# Patient Record
Sex: MALE | Race: WHITE | NOT HISPANIC OR LATINO | URBAN - METROPOLITAN AREA
[De-identification: names, ages, dates, MRNs, and addresses within clinical notes are randomized per-mention and may not be internally consistent; named-entity substitution may affect disease eponyms.]

---

## 2020-02-27 PROBLEM — 266464001 HEMORRHAGE OF RECTUM AND ANUS: Status: ACTIVE | Noted: 2020-02-27

## 2020-02-27 PROBLEM — 271737000 ANEMIA: Status: ACTIVE | Noted: 2020-02-27

## 2020-02-27 PROBLEM — 161464003 HISTORY OF PSYCHIATRIC DISORDER: Status: ACTIVE | Noted: 2020-02-27

## 2020-02-27 PROBLEM — 62315008 DIARRHEA: Status: INACTIVE | Noted: 2020-02-27

## 2020-02-27 PROBLEM — 420054005 ALCOHOLIC CIRRHOSIS: Status: ACTIVE | Noted: 2020-02-27

## 2020-05-13 PROBLEM — 42345000 POLYNEUROPATHY: Status: ACTIVE | Noted: 2020-05-13

## 2020-05-13 PROBLEM — 110483000 TOBACCO USE: Status: ACTIVE | Noted: 2020-05-13

## 2020-05-13 PROBLEM — 275978004 SCREENING FOR MALIGNANT NEOPLASM OF COLON: Status: ACTIVE | Noted: 2020-05-13

## 2020-08-11 ENCOUNTER — OFFICE VISIT (OUTPATIENT)
Dept: URBAN - METROPOLITAN AREA CLINIC 27 | Facility: CLINIC | Age: 55
End: 2020-08-11

## 2020-08-11 PROBLEM — 14760008 CONSTIPATION: Status: ACTIVE | Noted: 2020-08-11

## 2020-08-28 ENCOUNTER — OFFICE VISIT (OUTPATIENT)
Dept: URBAN - METROPOLITAN AREA SURGERY CENTER 7 | Facility: SURGERY CENTER | Age: 55
End: 2020-08-28

## 2022-04-30 ENCOUNTER — TELEPHONE ENCOUNTER (OUTPATIENT)
Dept: URBAN - METROPOLITAN AREA CLINIC 121 | Facility: CLINIC | Age: 57
End: 2022-04-30

## 2022-04-30 RX ORDER — PROMETHAZINE HYDROCHLORIDE 25 MG/1
1 PR Q HS SUPPOSITORY RECTAL
OUTPATIENT
Start: 2009-01-07

## 2022-04-30 RX ORDER — DIPHENOXYLATE HYDROCHLORIDE AND ATROPINE SULFATE 2.5; .025 MG/1; MG/1
TABLET ORAL
OUTPATIENT
Start: 2009-11-18

## 2022-04-30 RX ORDER — ESOMEPRAZOLE MAGNESIUM 40 MG
TAKE 1 TAB PO QD CAPSULE,DELAYED RELEASE (ENTERIC COATED) ORAL
OUTPATIENT
Start: 2014-02-21 | End: 2020-02-26

## 2022-04-30 RX ORDER — PROMETHAZINE HYDROCHLORIDE 25 MG/1
1 PO Q6H PRN TABLET ORAL
OUTPATIENT
Start: 2006-10-02

## 2022-04-30 RX ORDER — ESOMEPRAZOLE MAGNESIUM 40 MG
TAKE 1 CAPSULE BY MOUTH 30 MINUTES BEFORE BREAKFAST CAPSULE,DELAYED RELEASE (ENTERIC COATED) ORAL
OUTPATIENT
Start: 2013-06-17 | End: 2020-02-26

## 2022-04-30 RX ORDER — SPIRONOLACTONE 25 MG/1
QD TABLET, FILM COATED ORAL
OUTPATIENT
Start: 2009-11-18 | End: 2020-02-26

## 2022-04-30 RX ORDER — FENTANYL CITRATE 1200 UG/1
LOZENGE ORAL; TRANSMUCOSAL
OUTPATIENT
Start: 2006-01-27

## 2022-04-30 RX ORDER — SPIRONOLACTONE 25 MG/1
QD TABLET, FILM COATED ORAL
OUTPATIENT
Start: 2009-11-18

## 2022-04-30 RX ORDER — DEXLANSOPRAZOLE 60 MG/1
TAKE 1 CAP PO QD CAPSULE, DELAYED RELEASE ORAL
OUTPATIENT
Start: 2012-06-15 | End: 2020-02-26

## 2022-04-30 RX ORDER — METOCLOPRAMIDE HYDROCHLORIDE 10 MG/1
TAKE 1 TAB PO TID TABLET ORAL
OUTPATIENT
Start: 2012-06-15 | End: 2020-02-26

## 2022-04-30 RX ORDER — DIPHENOXYLATE HCL/ATROPINE 2.5-.025MG
1-2 PO Q 6H  PRN TABLET ORAL
OUTPATIENT
Start: 2011-03-01

## 2022-04-30 RX ORDER — BACLOFEN 10 MG/1
1/2 TABLET PO TID TABLET ORAL
OUTPATIENT
Start: 2020-03-26 | End: 2020-05-13

## 2022-04-30 RX ORDER — PROMETHAZINE HYDROCHLORIDE 25 MG/1
1 PO Q6H PRN TABLET ORAL
OUTPATIENT
Start: 2007-07-12 | End: 2007-07-12

## 2022-04-30 RX ORDER — ESOMEPRAZOLE MAGNESIUM 40 MG
1 CAPSULE PO QAM CAPSULE,DELAYED RELEASE (ENTERIC COATED) ORAL
OUTPATIENT
Start: 2013-10-25 | End: 2020-02-26

## 2022-04-30 RX ORDER — DEXLANSOPRAZOLE 60 MG/1
1 PO QD CAPSULE, DELAYED RELEASE ORAL
OUTPATIENT
Start: 2011-09-21

## 2022-04-30 RX ORDER — ESOMEPRAZOLE MAGNESIUM 40 MG
TAKE 1 TAB PO QD CAPSULE,DELAYED RELEASE (ENTERIC COATED) ORAL
OUTPATIENT
Start: 2014-02-21

## 2022-04-30 RX ORDER — DEXLANSOPRAZOLE 60 MG/1
TAKE 1 CAP PO QD CAPSULE, DELAYED RELEASE ORAL
OUTPATIENT
Start: 2012-06-15

## 2022-04-30 RX ORDER — METOCLOPRAMIDE HYDROCHLORIDE 10 MG/1
1 AC, PRN TABLET ORAL
OUTPATIENT
Start: 2011-08-05

## 2022-04-30 RX ORDER — PROMETHAZINE HYDROCHLORIDE 25 MG/1
1 PR Q 6HRS PRN SUPPOSITORY RECTAL
OUTPATIENT
Start: 2009-05-26

## 2022-04-30 RX ORDER — PROMETHAZINE HYDROCHLORIDE 25 MG/1
1 PR Q 6HRS PRN SUPPOSITORY RECTAL
OUTPATIENT
Start: 2009-05-26 | End: 2020-02-26

## 2022-04-30 RX ORDER — DIPHENOXYLATE HCL/ATROPINE 2.5-.025MG
1-2 PO Q 6H  PRN TABLET ORAL
OUTPATIENT
Start: 2011-03-01 | End: 2020-02-26

## 2022-04-30 RX ORDER — FENTANYL CITRATE 1200 UG/1
LOZENGE ORAL; TRANSMUCOSAL
OUTPATIENT
Start: 2006-01-27 | End: 2020-02-26

## 2022-04-30 RX ORDER — PROMETHAZINE HYDROCHLORIDE 25 MG/1
1 PR Q6H PRN SUPPOSITORY RECTAL
OUTPATIENT
Start: 2006-10-02 | End: 2020-02-26

## 2022-04-30 RX ORDER — PANCRELIPASE 24000; 76000; 120000 [USP'U]/1; [USP'U]/1; [USP'U]/1
TAKE 1 TAB PO BID CAPSULE, DELAYED RELEASE PELLETS ORAL
OUTPATIENT
Start: 2012-06-15

## 2022-04-30 RX ORDER — ESOMEPRAZOLE MAGNESIUM 40 MG
1 CAPSULE PO QAM CAPSULE,DELAYED RELEASE (ENTERIC COATED) ORAL
OUTPATIENT
Start: 2013-10-25

## 2022-04-30 RX ORDER — PAROXETINE HYDROCHLORIDE 20 MG/1
TAKE 1/2 QD TABLET, FILM COATED ORAL
OUTPATIENT
Start: 2011-01-24

## 2022-04-30 RX ORDER — BACLOFEN 10 MG/1
1/2 TABLET PO TID TABLET ORAL
OUTPATIENT
Start: 2020-03-26

## 2022-04-30 RX ORDER — PAROXETINE HYDROCHLORIDE 20 MG/1
TAKE 1/2 QD TABLET, FILM COATED ORAL
OUTPATIENT
Start: 2011-01-24 | End: 2020-02-26

## 2022-04-30 RX ORDER — METOCLOPRAMIDE HYDROCHLORIDE 10 MG/1
1 AC, PRN TABLET ORAL
OUTPATIENT
Start: 2011-08-05 | End: 2020-02-26

## 2022-04-30 RX ORDER — METOCLOPRAMIDE HYDROCHLORIDE 10 MG/1
TAKE 1 TAB PO TID TABLET ORAL
OUTPATIENT
Start: 2012-06-15

## 2022-04-30 RX ORDER — DEXLANSOPRAZOLE 60 MG/1
1 PO QD CAPSULE, DELAYED RELEASE ORAL
OUTPATIENT
Start: 2011-09-21 | End: 2020-02-26

## 2022-04-30 RX ORDER — ESOMEPRAZOLE MAGNESIUM 40 MG
TAKE 1 CAPSULE BY MOUTH 30 MINUTES BEFORE BREAKFAST CAPSULE,DELAYED RELEASE (ENTERIC COATED) ORAL
OUTPATIENT
Start: 2013-06-17

## 2022-04-30 RX ORDER — PANCRELIPASE 24000; 76000; 120000 [USP'U]/1; [USP'U]/1; [USP'U]/1
TAKE 1 TAB PO BID CAPSULE, DELAYED RELEASE PELLETS ORAL
OUTPATIENT
Start: 2012-06-15 | End: 2020-02-26

## 2022-04-30 RX ORDER — DIPHENOXYLATE HYDROCHLORIDE AND ATROPINE SULFATE 2.5; .025 MG/1; MG/1
TABLET ORAL
OUTPATIENT
Start: 2009-11-18 | End: 2020-02-26

## 2022-05-01 ENCOUNTER — TELEPHONE ENCOUNTER (OUTPATIENT)
Dept: URBAN - METROPOLITAN AREA CLINIC 121 | Facility: CLINIC | Age: 57
End: 2022-05-01

## 2022-05-01 RX ORDER — ESOMEPRAZOLE MAGNESIUM 40 MG
1 PO QD CAPSULE,DELAYED RELEASE (ENTERIC COATED) ORAL
Status: ACTIVE | COMMUNITY
Start: 2005-10-03

## 2022-05-01 RX ORDER — HEPATITIS A AND HEPATITIS B (RECOMBINANT) VACCINE 720; 20 [IU]/ML; UG/ML
INJECT 1 VIAL  IM AS DIRECTED...3 SERIES SHOT...0, 1, 6 MONTHS INJECTION, SUSPENSION INTRAMUSCULAR
Status: ACTIVE | COMMUNITY
Start: 2020-08-28

## 2022-05-01 RX ORDER — SPIRONOLACTONE 50 MG/1
TAKE ONE TABLET BY MOUTH ONCE DAILY TABLET, FILM COATED ORAL
Status: ACTIVE | COMMUNITY
Start: 2020-02-26

## 2022-05-01 RX ORDER — FUROSEMIDE 40 MG/1
TAKE ONE TABLET BY MOUTH ONCE DAILY TABLET ORAL
Status: ACTIVE | COMMUNITY
Start: 2020-02-26

## 2022-12-28 ENCOUNTER — OFFICE VISIT (OUTPATIENT)
Dept: URBAN - METROPOLITAN AREA CLINIC 27 | Facility: CLINIC | Age: 57
End: 2022-12-28

## 2022-12-28 RX ORDER — SPIRONOLACTONE 50 MG/1
TAKE ONE TABLET BY MOUTH ONCE DAILY TABLET, FILM COATED ORAL
Status: ACTIVE | COMMUNITY
Start: 2020-02-26

## 2022-12-28 RX ORDER — FUROSEMIDE 40 MG/1
TAKE ONE TABLET BY MOUTH ONCE DAILY TABLET ORAL
Status: ACTIVE | COMMUNITY
Start: 2020-02-26

## 2022-12-28 RX ORDER — ESOMEPRAZOLE MAGNESIUM 40 MG
1 PO QD CAPSULE,DELAYED RELEASE (ENTERIC COATED) ORAL
Status: ACTIVE | COMMUNITY
Start: 2005-10-03

## 2022-12-28 RX ORDER — HEPATITIS A AND HEPATITIS B (RECOMBINANT) VACCINE 720; 20 [IU]/ML; UG/ML
INJECT 1 VIAL  IM AS DIRECTED...3 SERIES SHOT...0, 1, 6 MONTHS INJECTION, SUSPENSION INTRAMUSCULAR
Status: ACTIVE | COMMUNITY
Start: 2020-08-28

## 2023-01-30 ENCOUNTER — OFFICE VISIT (OUTPATIENT)
Dept: URBAN - METROPOLITAN AREA CLINIC 27 | Facility: CLINIC | Age: 58
End: 2023-01-30

## 2023-01-30 VITALS — HEIGHT: 68 IN

## 2023-01-30 RX ORDER — HEPATITIS A AND HEPATITIS B (RECOMBINANT) VACCINE 720; 20 [IU]/ML; UG/ML
INJECT 1 VIAL  IM AS DIRECTED...3 SERIES SHOT...0, 1, 6 MONTHS INJECTION, SUSPENSION INTRAMUSCULAR
Status: DISCONTINUED | COMMUNITY
Start: 2020-08-28

## 2023-01-30 RX ORDER — ESOMEPRAZOLE MAGNESIUM 40 MG
1 PO QD CAPSULE,DELAYED RELEASE (ENTERIC COATED) ORAL
Status: DISCONTINUED | COMMUNITY
Start: 2005-10-03

## 2023-01-30 RX ORDER — FUROSEMIDE 40 MG/1
TAKE ONE TABLET BY MOUTH ONCE DAILY TABLET ORAL
Status: DISCONTINUED | COMMUNITY
Start: 2020-02-26

## 2023-01-30 RX ORDER — SPIRONOLACTONE 50 MG/1
TAKE ONE TABLET BY MOUTH ONCE DAILY TABLET, FILM COATED ORAL
Status: DISCONTINUED | COMMUNITY
Start: 2020-02-26

## 2023-02-02 ENCOUNTER — TELEPHONE ENCOUNTER (OUTPATIENT)
Dept: URBAN - METROPOLITAN AREA CLINIC 27 | Facility: CLINIC | Age: 58
End: 2023-02-02

## 2023-02-02 ENCOUNTER — OFFICE VISIT (OUTPATIENT)
Dept: URBAN - METROPOLITAN AREA CLINIC 27 | Facility: CLINIC | Age: 58
End: 2023-02-02
Payer: COMMERCIAL

## 2023-02-02 VITALS
WEIGHT: 165 LBS | DIASTOLIC BLOOD PRESSURE: 97 MMHG | BODY MASS INDEX: 25.01 KG/M2 | SYSTOLIC BLOOD PRESSURE: 150 MMHG | HEART RATE: 110 BPM | HEIGHT: 68 IN

## 2023-02-02 DIAGNOSIS — Z72.0 TOBACCO ABUSE: ICD-10-CM

## 2023-02-02 DIAGNOSIS — Z86.010 HX OF ADENOMATOUS POLYP OF COLON: ICD-10-CM

## 2023-02-02 DIAGNOSIS — K21.9 GERD: ICD-10-CM

## 2023-02-02 DIAGNOSIS — K74.60 CIRRHOSIS (ETOH): ICD-10-CM

## 2023-02-02 DIAGNOSIS — I81 PORTAL VEIN THROMBOSIS: ICD-10-CM

## 2023-02-02 DIAGNOSIS — R19.7 DIARRHEA: ICD-10-CM

## 2023-02-02 DIAGNOSIS — Z87.898 HISTORY OF ALCOHOL ABUSE: ICD-10-CM

## 2023-02-02 PROBLEM — 429047008 HISTORY OF ADENOMATOUS POLYP OF COLON: Status: ACTIVE | Noted: 2023-02-02

## 2023-02-02 PROBLEM — 266468003 CIRRHOSIS - NON-ALCOHOLIC: Status: ACTIVE | Noted: 2023-02-02

## 2023-02-02 PROCEDURE — 99204 OFFICE O/P NEW MOD 45 MIN: CPT | Performed by: INTERNAL MEDICINE

## 2023-02-02 RX ORDER — POLYETHYLENE GLYCOL-3350 AND ELECTROLYTES 236; 6.74; 5.86; 2.97; 22.74 G/274.31G; G/274.31G; G/274.31G; G/274.31G; G/274.31G
AS DIRECTED POWDER, FOR SOLUTION ORAL ONCE
Qty: 1 | OUTPATIENT
Start: 2023-02-02 | End: 2023-02-03

## 2023-02-02 NOTE — HPI-TODAY'S VISIT:
Patient here at the request of Dr. Guzman for repeat colonoscopy.  His last colonoscopy was in 2020; two adenomatous polyps were removed; one was a 2 cm sessile polyp.  The prep was somewhat suboptimal.  He is currently doing mostly well from a GI standpoint at present.  He has occasional reflux symptoms, approximately 2-3 times per week, for which he uses OTC Nexium.  He is adherent to an antireflux regimen.  He has episodic diarrhea, approximately 4-5 times per month, which occurs without specific trigger.  It will sometimes last 1 to 3 days.  He thinks that his symptoms began after he recovered from COVID in early 2022.  He does note that ground beef and yogurt will trigger symptoms.  He thinks that he may be lactose intolerant, but apparently does drink a yogurt probiotic supplement every day.  He uses a fiber supplement, though only PRN.  Imodium and Lomotil are helpful though he does not use these regularly, as they sometimes cause constipation.  He was hospitalized in late 2021 with dehydration from COVID.  He has a history of alcohol abuse but has been abstinent since late 2019.  He does chew tobacco.  He has a history of (probable) alcoholic cirrhosis and ascites; he is no longer taking diuretics.  He has apparently completed the hepatitis A and B vaccines.    He has a history of chronic pancreatitis s/p Puestow 2002.  He underwent paracentesis of 5L x2 in 2019. He underwent EGD/flex at that time which revealed a small nonbleeding esophageal varix, and inflamed hemorrhoids. He also underwent repeat paracentesis (3L); no evidence of SBP was present. Cytology was negative.  He underwent EUS in early 2022.  No esophageal or gastric varices were seen.  Mild esophagitis in the distal esophagus was seen.  Mild portal hypertensive gastropathy was seen.  No pancreatic mass was seen.  The body and tail of the pancreas were atrophic.  A suspected portal vein thrombosis was seen with significant collateral vessels adjacent.  There was another suspected thrombus in the hilum of the liver.  The common bile duct could not be visualized.

## 2023-02-02 NOTE — PHYSICAL EXAM CHEST:
[address] [place] [id number]  Phone: [phone]             March 15, 2019    Patient: Solomon Clarke   YOB: 1973   Date of Visit: 3/14/2019       To Whom It May Concern:    Solomon Clarke was seen and treated in our facility  beginning 3/14/2019 until 03/15/2019     Sincerely,       Patricia Mancia RN         Signature:__________________________________ breathing is unlabored without accessory muscle use, normal breath sounds.

## 2023-02-06 ENCOUNTER — LAB OUTSIDE AN ENCOUNTER (OUTPATIENT)
Dept: URBAN - METROPOLITAN AREA CLINIC 27 | Facility: CLINIC | Age: 58
End: 2023-02-06

## 2023-02-10 ENCOUNTER — OFFICE VISIT (OUTPATIENT)
Dept: URBAN - METROPOLITAN AREA SURGERY CENTER 7 | Facility: SURGERY CENTER | Age: 58
End: 2023-02-10
Payer: COMMERCIAL

## 2023-02-10 ENCOUNTER — TELEPHONE ENCOUNTER (OUTPATIENT)
Dept: URBAN - METROPOLITAN AREA CLINIC 27 | Facility: CLINIC | Age: 58
End: 2023-02-10

## 2023-02-10 DIAGNOSIS — D12.5 ADENOMA OF SIGMOID COLON: ICD-10-CM

## 2023-02-10 DIAGNOSIS — D12.2 ADENOMA OF ASCENDING COLON: ICD-10-CM

## 2023-02-10 DIAGNOSIS — Z86.010 ADENOMAS PERSONAL HISTORY OF COLONIC POLYPS: ICD-10-CM

## 2023-02-10 DIAGNOSIS — K63.89 APPENDICITIS EPIPLOICA: ICD-10-CM

## 2023-02-10 PROCEDURE — 45380 COLONOSCOPY AND BIOPSY: CPT | Performed by: INTERNAL MEDICINE

## 2023-02-10 PROCEDURE — G8907 PT DOC NO EVENTS ON DISCHARG: HCPCS | Performed by: INTERNAL MEDICINE

## 2023-02-10 PROCEDURE — 45385 COLONOSCOPY W/LESION REMOVAL: CPT | Performed by: INTERNAL MEDICINE

## 2023-02-15 ENCOUNTER — TELEPHONE ENCOUNTER (OUTPATIENT)
Dept: URBAN - METROPOLITAN AREA CLINIC 27 | Facility: CLINIC | Age: 58
End: 2023-02-15

## 2023-02-15 PROBLEM — 235595009 GASTROESOPHAGEAL REFLUX DISEASE: Status: ACTIVE | Noted: 2023-02-02

## 2023-02-17 ENCOUNTER — TELEPHONE ENCOUNTER (OUTPATIENT)
Dept: URBAN - METROPOLITAN AREA CLINIC 27 | Facility: CLINIC | Age: 58
End: 2023-02-17

## 2023-02-22 ENCOUNTER — TELEPHONE ENCOUNTER (OUTPATIENT)
Dept: URBAN - METROPOLITAN AREA CLINIC 27 | Facility: CLINIC | Age: 58
End: 2023-02-22

## 2023-02-22 ENCOUNTER — OFFICE VISIT (OUTPATIENT)
Dept: URBAN - METROPOLITAN AREA SURGERY CENTER 7 | Facility: SURGERY CENTER | Age: 58
End: 2023-02-22
Payer: COMMERCIAL

## 2023-02-22 DIAGNOSIS — K74.60 ADVANCED CIRRHOSIS: ICD-10-CM

## 2023-02-22 DIAGNOSIS — K31.89 ACQUIRED DEFORMITY OF DUODENUM: ICD-10-CM

## 2023-02-22 DIAGNOSIS — I85.10 ESOPH VARICE OTHER DIS: ICD-10-CM

## 2023-02-22 PROCEDURE — 43239 EGD BIOPSY SINGLE/MULTIPLE: CPT | Performed by: INTERNAL MEDICINE

## 2023-02-22 PROCEDURE — G8907 PT DOC NO EVENTS ON DISCHARG: HCPCS | Performed by: INTERNAL MEDICINE

## 2023-02-24 ENCOUNTER — TELEPHONE ENCOUNTER (OUTPATIENT)
Dept: URBAN - METROPOLITAN AREA CLINIC 27 | Facility: CLINIC | Age: 58
End: 2023-02-24

## 2023-03-13 ENCOUNTER — TELEPHONE ENCOUNTER (OUTPATIENT)
Dept: URBAN - METROPOLITAN AREA CLINIC 27 | Facility: CLINIC | Age: 58
End: 2023-03-13

## 2023-03-17 ENCOUNTER — TELEPHONE ENCOUNTER (OUTPATIENT)
Dept: URBAN - METROPOLITAN AREA CLINIC 27 | Facility: CLINIC | Age: 58
End: 2023-03-17

## 2023-03-31 ENCOUNTER — OFFICE VISIT (OUTPATIENT)
Dept: URBAN - METROPOLITAN AREA CLINIC 27 | Facility: CLINIC | Age: 58
End: 2023-03-31
Payer: COMMERCIAL

## 2023-03-31 VITALS
RESPIRATION RATE: 17 BRPM | BODY MASS INDEX: 25.01 KG/M2 | DIASTOLIC BLOOD PRESSURE: 90 MMHG | SYSTOLIC BLOOD PRESSURE: 137 MMHG | HEIGHT: 68 IN | HEART RATE: 93 BPM | WEIGHT: 165 LBS

## 2023-03-31 DIAGNOSIS — R19.7 DIARRHEA: ICD-10-CM

## 2023-03-31 DIAGNOSIS — K21.9 GERD: ICD-10-CM

## 2023-03-31 DIAGNOSIS — R63.4 WEIGHT LOSS: ICD-10-CM

## 2023-03-31 DIAGNOSIS — K74.60 CIRRHOSIS (ETOH): ICD-10-CM

## 2023-03-31 PROCEDURE — 99214 OFFICE O/P EST MOD 30 MIN: CPT | Performed by: INTERNAL MEDICINE

## 2023-03-31 NOTE — HPI-TODAY'S VISIT:
Patient here for GI followup.  He underwent panendoscopy earlier this year.  Mild/moderate portal hypertensive gastropathy was found, and mild nonerosive gastritis with some bile was present.  There was evidence of a intact Puestow anastomosis and a medium sized hiatal hernia.  No esophageal varices or Blackmon's was seen.  Small bowel biopsies were normal, and gastric biopsies were negative for H. pylori.  Six adenomatous polyps were removed during the colonoscopy.  Random colon biopsies were normal.  The prep was suboptimal.  He underwent MRI of the abdomen last month which was markedly abnormal.  There was a 4 cm mass in the anterior segment of the right hepatic lobe, with diffuse intrahepatic biliary dilation at the margin of this mass.  Multiple (>10) smaller masses were seen in the right lobe of the liver.  There was a retrocaval mass measuring 2.5 cm, likely an enlarged lymph node.  Diffuse atrophy of the pancreas was seen.  Puestow procedure stigmata were seen.  The main pancreatic duct was mildly dilated.  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  He subsequently underwent CT-guided biopsy right hepatic lobe lesion early this month; this showed moderately differentiated adenocarcinoma, consistent with cholangiocarcinoma.  He is actually doing fairly well from a GI standpoint at present.  He has occasional reflux symptoms, approximately once a week for which he uses OTC Nexium.  He notes that tomatoes and orange juice will trigger symptoms.  He has had resolution of his diarrhea in the past month.  He does take a probiotic.  He does not take a fiber supplement, as Metamucil apparently caused worsening bowel irregularity.  He does not use antidiarrheals.  He has intermittent flatulence.  He uses PRN Gas-X.  He denies any food triggers aside from ice cream, which causes bloating.  He has lost 8 pounds over the past few months and his appetite has been diminished.  He is scheduled to see oncology next week.  Labs in Dr. Guzman's office last week show normal liver enzymes aside from an AP of 518.  His TSH and CBC were normal.  Recent AFP was normal.   He was hospitalized in late 2021 with dehydration from COVID.  He has a history of alcohol abuse but has been abstinent since late 2019.  He does chew tobacco.  He has a history of (probable) alcoholic cirrhosis and ascites; he is no longer taking diuretics.  He has apparently completed the hepatitis A and B vaccines.  He has a history of chronic pancreatitis s/p Puestow 2002.  He underwent paracentesis of 5L x2 in 2019. He underwent EGD/flex at that time which revealed a small nonbleeding esophageal varix, and inflamed hemorrhoids. He also underwent repeat paracentesis (3L); no evidence of SBP was present. Cytology was negative.  He underwent EUS in early 2022.  No esophageal or gastric varices were seen.  Mild esophagitis in the distal esophagus was seen.  Mild portal hypertensive gastropathy was seen.  No pancreatic mass was seen.  The body and tail of the pancreas were atrophic.  A suspected portal vein thrombosis was seen with significant collateral vessels adjacent.  There was another suspected thrombus in the hilum of the liver.  The common bile duct could not be visualized.

## 2023-04-01 ENCOUNTER — DASHBOARD ENCOUNTERS (OUTPATIENT)
Age: 58
End: 2023-04-01

## 2023-04-27 ENCOUNTER — OUT OF OFFICE VISIT (OUTPATIENT)
Dept: URBAN - METROPOLITAN AREA MEDICAL CENTER 8 | Facility: MEDICAL CENTER | Age: 58
End: 2023-04-27

## 2023-04-27 ENCOUNTER — CLAIMS CREATED FROM THE CLAIM WINDOW (OUTPATIENT)
Dept: URBAN - METROPOLITAN AREA MEDICAL CENTER 8 | Facility: MEDICAL CENTER | Age: 58
End: 2023-04-27
Payer: COMMERCIAL

## 2023-04-27 DIAGNOSIS — C22.1 CANCER OF INTRAHEPATIC BILE DUCTS: ICD-10-CM

## 2023-04-27 DIAGNOSIS — A41.59 KLEBSIELLA SEPSIS: ICD-10-CM

## 2023-04-27 DIAGNOSIS — R41.82 ACUTE ON CHRONIC ALTERATION IN MENTAL STATUS: ICD-10-CM

## 2023-04-27 PROCEDURE — 99232 SBSQ HOSP IP/OBS MODERATE 35: CPT | Performed by: INTERNAL MEDICINE

## 2023-04-27 PROCEDURE — 99222 1ST HOSP IP/OBS MODERATE 55: CPT | Performed by: INTERNAL MEDICINE

## 2023-04-27 PROCEDURE — G8427 DOCREV CUR MEDS BY ELIG CLIN: HCPCS | Performed by: INTERNAL MEDICINE
